# Patient Record
Sex: MALE | Race: WHITE | URBAN - METROPOLITAN AREA
[De-identification: names, ages, dates, MRNs, and addresses within clinical notes are randomized per-mention and may not be internally consistent; named-entity substitution may affect disease eponyms.]

---

## 2018-06-12 ENCOUNTER — OFFICE VISIT (OUTPATIENT)
Dept: FAMILY MEDICINE | Facility: CLINIC | Age: 48
End: 2018-06-12
Payer: COMMERCIAL

## 2018-06-12 VITALS
WEIGHT: 250.6 LBS | HEART RATE: 78 BPM | HEIGHT: 74 IN | OXYGEN SATURATION: 96 % | BODY MASS INDEX: 32.16 KG/M2 | RESPIRATION RATE: 14 BRPM | DIASTOLIC BLOOD PRESSURE: 78 MMHG | SYSTOLIC BLOOD PRESSURE: 122 MMHG | TEMPERATURE: 97.7 F

## 2018-06-12 DIAGNOSIS — Z82.49 FAMILY HISTORY OF ISCHEMIC HEART DISEASE: ICD-10-CM

## 2018-06-12 DIAGNOSIS — R07.9 CHEST PAIN, UNSPECIFIED TYPE: Primary | ICD-10-CM

## 2018-06-12 DIAGNOSIS — F17.200 TOBACCO USE DISORDER: ICD-10-CM

## 2018-06-12 DIAGNOSIS — E66.9 NON MORBID OBESITY, UNSPECIFIED OBESITY TYPE: ICD-10-CM

## 2018-06-12 PROCEDURE — 93000 ELECTROCARDIOGRAM COMPLETE: CPT | Performed by: FAMILY MEDICINE

## 2018-06-12 PROCEDURE — 99205 OFFICE O/P NEW HI 60 MIN: CPT | Performed by: FAMILY MEDICINE

## 2018-06-12 NOTE — MR AVS SNAPSHOT
After Visit Summary   6/12/2018    Rusty Gilmore    MRN: [id number]           Patient Information     Date Of Birth          1970        Visit Information        Provider Department      6/12/2018 9:00 AM Andrew Boudreaux MD [facility]        Today's Diagnoses     Chest pain, unspecified type    -  1      Care Instructions    After extensive discussion of your cardiovascular risk factors, the risks of your symptoms, the seriousness of undiagnosed heart disease, you preferred not to proceed with further testing to rule out a heart attack, lung clots, and other possible serious conditions that could endanger your life.    Reconsider your decision and have yourself brought to the ER later today for the persistent chest discomfort.          Follow-ups after your visit        Follow-up notes from your care team     Return if symptoms worsen or fail to improve.      Who to contact     If you have questions or need follow up information about today's clinic visit or your schedule please contact [facility] directly at [phone].  Normal or non-critical lab and imaging results will be communicated to you by MyChart, letter or phone within 4 business days after the clinic has received the results. If you do not hear from us within 7 days, please contact the clinic through MyChart or phone. If you have a critical or abnormal lab result, we will notify you by phone as soon as possible.  Submit refill requests through [employer] or call your pharmacy and they will forward the refill request to us. Please allow 3 business days for your refill to be completed.          Additional Information About Your Visit        Care EveryWhere ID     This is your Care EveryWhere ID. This could be used by other organizations to access your [facility] medical records  [id number]        Your Vitals Were     Pulse Temperature Respirations Height Pulse Oximetry BMI (Body Mass Index)    78  "97.7  F (36.5  C) (Tympanic) 14 6' 1.5\" (1.867 m) 96% 32.61 kg/m2       Blood Pressure from Last 3 Encounters:   06/12/18 122/78   01/18/16 (!) 159/104   12/08/15 146/88    Weight from Last 3 Encounters:   06/12/18 250 lb 9.6 oz (113.7 kg)   01/18/16 230 lb (104.3 kg)   12/08/15 230 lb (104.3 kg)              We Performed the Following     EKG 12-lead complete w/read - Clinics        Primary Care Provider Office Phone # Fax #    Andrew Jeremias Boudreaux [phone] [address] [facility] [address]        Equal Access to Services     FARIBA LIZARRAGA : Asim samuelso Socarolyne, waaxda luqadaha, qaybta kaalmada adeegyada, kevan weiner . So [facility] [phone].    ATENCIÓN: Si habla español, tiene a francisco disposición servicios gratuitos de asistencia lingüística. Llame al [phone].    We comply with applicable federal civil rights laws and [place] laws. We do not discriminate on the basis of race, color, national origin, age, disability, sex, sexual orientation, or gender identity.            Thank you!     Thank you for choosing [facility]  for your care. Our goal is always to provide you with excellent care. Hearing back from our patients is one way we can continue to improve our services. Please take a few minutes to complete the written survey that you may receive in the mail after your visit with us. Thank you!             Your Updated Medication List - Protect others around you: Learn how to safely use, store and throw away your medicines at www.disposemymeds.org.      Notice  As of 6/12/2018  9:53 AM    You have not been prescribed any medications.      "

## 2018-06-12 NOTE — PROGRESS NOTES
SUBJECTIVE:   Rusty Gilmore is a 47 year old male who presents to clinic today for the following health issues:  Chief Complaint   Patient presents with     Chest Pain     Pt here for chest pain over the past month.       CHEST PAIN     Onset: 1 month ago    Description:   Location:  left side  Character: burning, tight and heavy  Radiation: did have pain in arm around elbow area last wk for 2 days, did have a stiff neck on the left side last wk which lasted for 1 day  Duration: longest was for 4 days last wk days and intermittent, the shortest time frame was a couple hours, also felt winded, might happen once per wk; patient states since resolution of this episode 5 days ago, he continued to have mild left sided chest heaviness and tightness    Intensity: mild, moderate    Progression of Symptoms:  same and intermittent    Accompanying Signs & Symptoms:  Shortness of breath: no  Sweating: no  Nausea/vomiting: no  Lightheadedness: no  Palpitations: YES, possibly  Fever/Chills: no  Cough: no  Heartburn: YES- with last episode of chest pain  Back pain: patient states he also has current right parathoracic deep seated back pain.    History:   Family history of heart disease YES- dad and grandpa  Tobacco use: YES- 1/2 ppd    Precipitating factors:   Worse with exertion: no  Worse with deep breaths :  Made him feel better  Related to food: no    Alleviating factors:  none       Therapies Tried and outcome: aspirin - took three tablets  81 mg daily while having chest pain last wk      Patient states last chest pain episode last week lasted 3 days and ended 5 days ago with no specific treatment.  Patient reports he has not noticed difference or change in ability to perform activities since then.  Patient did say he still has mild chest tightness today but no other symptoms.      Problem list and histories reviewed & adjusted, as indicated.  Additional history: as documented    Patient Active Problem List   Diagnosis      Cervicalgia     Pain in joint, lower leg     Lumbago     Past Surgical History:   Procedure Laterality Date     SURGICAL HISTORY OF -       ventral hernia     SURGICAL HISTORY OF -       L acl reconstruction      SURGICAL HISTORY OF -   8 yo     R  lower leg fracture - displaced- set under anesthesia     SURGICAL HISTORY OF -   2002    vasectomy       Social History   Substance Use Topics     Smoking status: Current Every Day Smoker     Years: 10.00     Types: Cigarettes     Smokeless tobacco: Never Used     Alcohol use Yes      Comment: occ.     Family History   Problem Relation Age of Onset     Lipids Father      HEART DISEASE Father      Stints in heart placed     CEREBROVASCULAR DISEASE Maternal Grandmother      CANCER Maternal Grandfather      Lung     DIABETES Paternal Grandmother      HEART DISEASE Paternal Grandfather      Quadrupal Bypass     Lipids Brother      Elevated         Patient states has cut back on smoking.  Patient reports he would like to quit eventually but no concrete plans to completely stop at this time; but plan to completely stop by end of year.    No current outpatient prescriptions on file.     Allergies   Allergen Reactions     Cats Itching     Hives       Horse Allergy Itching     Hives         Reviewed and updated as needed this visit by clinical staff  Tobacco  Allergies  Meds  Problems  Med Hx  Surg Hx  Fam Hx  Soc Hx        Reviewed and updated as needed this visit by Provider  Allergies  Meds  Problems         ROS:  C: NEGATIVE for fever, chills, change in weight  I: NEGATIVE for worrisome rashes, moles or lesions  E: NEGATIVE for vision changes or irritation  R: NEGATIVE for significant cough or SOB  CV: NEGATIVE for chest pain, palpitations or peripheral edema  GI: NEGATIVE for nausea, abdominal pain, heartburn, or change in bowel habits  : NEGATIVE for frequency, dysuria, or hematuria  M: NEGATIVE for significant arthralgias or myalgia  N: NEGATIVE for weakness,  "dizziness or paresthesias  E: NEGATIVE for temperature intolerance, skin/hair changes  H: NEGATIVE for bleeding problems    OBJECTIVE:                                                    /78  Pulse 78  Temp 97.7  F (36.5  C) (Tympanic)  Resp 14  Ht 6' 1.5\" (1.867 m)  Wt 250 lb 9.6 oz (113.7 kg)  SpO2 96%  BMI 32.61 kg/m2  Body mass index is 32.61 kg/(m^2).  GENERAL:  alert and no distress, ambulatory w/o assist  NECK: no tenderness, no adenopathy,  Thyroid not enlarged  RESP: lungs clear to auscultation - no rales, no rhonchi, no wheezes  CV: regular rates and rhythm, no murmur  CHEST: symmetrical; no TTP; pain described above not reproducible  MS: no edema  SKIN: no suspicious lesions, no rashes  ABD:  nontender    Diagnostic test results:  Diagnostic Test Results:  No results found for this or any previous visit (from the past 24 hour(s)).     ASSESSMENT/PLAN:                                                        ICD-10-CM    1. Chest pain, unspecified type R07.9 EKG 12-lead complete w/read - Clinics   2. Tobacco use disorder F17.200    3. Family history of ischemic heart disease Z82.49    4. Non morbid obesity, unspecified obesity type E66.9      Based on patient's history and exam above, highly concerning for unstable angina.  Other differentials would be pulmonary embolus, thoracic aortic disease, moderate to severe GERD, esophageal spasms, musculoskeletal pain, pleurisy  Patient was advised of risks of the above conditions, the worst of it being cardiac arrest or his demise.  Due need for further work up such as possible serial troponins, continuous cardiac monitoring, d-dimer, possible CT scan of the chest, and may need more acute treatment, patient was highly advised that he is best managed further in the ER. Clinic is not appropriate site for these more urgent testing. If his work up reveals non-acute condition, a future myocardial perfusion study may be appropriate but appropriateness may not " be determined until more acute conditions are ruled out.  Patient immediately expressed apprehension about this because he is worried about the cost. He said he is not sure if his insurance will cover ER visits. He was advised this provider will be unable to predict costs for the visit, but he was also advised of potentially even higher cost if he does have a serious heart event that hospitalizes him longer, in the ICU or at the worst he undergoes cardiac surgery.    He paused for a moment but still insisted that he not proceed to the ER, but will reconsider. He was encouraged to reconsider this thoroughly and seriously for his won safety and health.      Follow up with Provider - patient left against medial advice.   Patient Instructions   After extensive discussion of your cardiovascular risk factors, the risks of your symptoms, the seriousness of undiagnosed heart disease, you preferred not to proceed with further testing to rule out a heart attack, lung clots, and other possible serious conditions that could endanger your life.    Reconsider your decision and have yourself brought to the ER later today for the persistent chest discomfort.      Andrew Boudreaux MD  [facility]

## 2018-06-12 NOTE — PATIENT INSTRUCTIONS
After extensive discussion of your cardiovascular risk factors, the risks of your symptoms, the seriousness of undiagnosed heart disease, you preferred not to proceed with further testing to rule out a heart attack, lung clots, and other possible serious conditions that could endanger your life.    Reconsider your decision and have yourself brought to the ER later today for the persistent chest discomfort.

## 2018-06-18 PROBLEM — E66.9 NON MORBID OBESITY, UNSPECIFIED OBESITY TYPE: Status: ACTIVE | Noted: 2018-06-18

## 2019-06-25 ENCOUNTER — TELEPHONE (OUTPATIENT)
Dept: FAMILY MEDICINE | Facility: CLINIC | Age: 49
End: 2019-06-25

## 2019-06-25 NOTE — TELEPHONE ENCOUNTER
Panel Management Review    Composite cancer screening  Chart review shows that this patient is due/due soon for the following Colonoscopy  Summary:    Patient is due/failing the following:   COLONOSCOPY    Action needed:   Patient needs referral/order: colonoscopy    Type of outreach:    Sent letter.    Questions for provider review:    None                                                                                                                                    Debra Gibbons CMA

## 2019-06-25 NOTE — LETTER
June 25, 2019      Rusty Gilmore  [address] [facility] [address]          At this time you are due for a Colon Cancer Screening. Here is some information regarding this testing.     Recommended every 5-10 years, depending on your history, in order to prevent and detect colon cancer at its earliest stages.  Colon cancer is now the second leading cause of death in the [place] for both men and women and there are over 130,000 new cases and 50,000 deaths per year from colon cancer.  Colonoscopies can prevent 90-95% of these deaths.  Problem lesions can be removed before they ever become cancer. This test is not only looking for cancer, but also getting rid of precancerious lesions. You are usually given some sedation which makes the test very comfortable for most people.      If you do not wish to do a colonoscopy or cannot afford to do one, at this time, there is another option. It is called a FIT test or Fecal Immunochemical Occult Blood Test (take home stool sample kit).  It does not replace the colonoscopy for colorectal cancer screening, but it can detect hidden bleeding in the lower colon.  It does need to be repeated every year and if a positive result is obtained, you would be referred for a colonoscopy. The FIT test is really easy to do and does not require any  diet or medication restrictions and involves only one collection sample.      If you have completed either one of these tests or had a flexible sigmoidoscopy in the past five years at another facility, please have the records sent to our clinic so that we can best coordinate your care and update your chart.  Please call us if you have questions or would like arrange either to do a colonoscopy or obtain the necessary test kit for the Fecal Occult Test.    Sincerely,        Andrew Boudreaux MD